# Patient Record
Sex: FEMALE | Race: BLACK OR AFRICAN AMERICAN | NOT HISPANIC OR LATINO | Employment: UNEMPLOYED | URBAN - METROPOLITAN AREA
[De-identification: names, ages, dates, MRNs, and addresses within clinical notes are randomized per-mention and may not be internally consistent; named-entity substitution may affect disease eponyms.]

---

## 2020-09-20 PROBLEM — R03.0 ELEVATED BLOOD PRESSURE READING: Status: ACTIVE | Noted: 2020-09-20

## 2020-09-20 PROBLEM — Z3A.33 33 WEEKS GESTATION OF PREGNANCY: Status: ACTIVE | Noted: 2020-09-20

## 2020-09-20 PROBLEM — R06.02 SHORTNESS OF BREATH: Status: ACTIVE | Noted: 2020-09-20

## 2020-09-20 PROBLEM — E87.6 HYPOKALEMIA: Status: ACTIVE | Noted: 2020-09-20

## 2020-09-20 PROBLEM — R73.09 BLOOD GLUCOSE ABNORMAL: Status: ACTIVE | Noted: 2020-09-20

## 2020-09-20 PROBLEM — R10.9 ABDOMINAL PAIN AFFECTING PREGNANCY: Status: ACTIVE | Noted: 2020-09-20

## 2020-09-20 PROBLEM — O26.899 ABDOMINAL PAIN AFFECTING PREGNANCY: Status: ACTIVE | Noted: 2020-09-20

## 2020-09-29 PROBLEM — R06.02 SHORTNESS OF BREATH: Status: RESOLVED | Noted: 2020-09-20 | Resolved: 2020-09-29

## 2020-09-29 PROBLEM — R06.02 SHORTNESS OF BREATH: Status: ACTIVE | Noted: 2020-09-29

## 2020-09-29 PROBLEM — R03.0 ELEVATED BLOOD PRESSURE READING: Status: RESOLVED | Noted: 2020-09-20 | Resolved: 2020-09-29

## 2020-09-29 PROBLEM — O09.40 GESTATIONAL DIABETES MELLITUS (GDM) AFFECTING FIFTH PREGNANCY: Status: ACTIVE | Noted: 2020-09-29

## 2020-09-29 PROBLEM — Z3A.33 33 WEEKS GESTATION OF PREGNANCY: Status: RESOLVED | Noted: 2020-09-20 | Resolved: 2020-09-29

## 2020-09-29 PROBLEM — O36.8130 DECREASED FETAL MOVEMENTS IN THIRD TRIMESTER: Status: ACTIVE | Noted: 2020-09-29

## 2020-09-29 PROBLEM — O24.419 GESTATIONAL DIABETES MELLITUS (GDM) AFFECTING FIFTH PREGNANCY: Status: ACTIVE | Noted: 2020-09-29

## 2020-09-29 PROBLEM — R00.0 TACHYCARDIA: Status: ACTIVE | Noted: 2020-09-29

## 2020-09-29 PROBLEM — E87.6 HYPOKALEMIA: Status: RESOLVED | Noted: 2020-09-20 | Resolved: 2020-09-29

## 2020-09-29 PROBLEM — O26.899 ABDOMINAL PAIN AFFECTING PREGNANCY: Status: RESOLVED | Noted: 2020-09-20 | Resolved: 2020-09-29

## 2020-09-29 PROBLEM — R10.9 ABDOMINAL PAIN AFFECTING PREGNANCY: Status: RESOLVED | Noted: 2020-09-20 | Resolved: 2020-09-29

## 2020-09-29 PROBLEM — Z3A.34 34 WEEKS GESTATION OF PREGNANCY: Status: ACTIVE | Noted: 2020-09-29

## 2020-09-30 PROBLEM — R11.2 NAUSEA & VOMITING: Status: ACTIVE | Noted: 2020-09-30

## 2020-09-30 PROBLEM — R63.1 POLYDIPSIA: Status: ACTIVE | Noted: 2020-09-30

## 2020-10-11 PROBLEM — O23.43 URINARY TRACT INFECTION IN MOTHER DURING THIRD TRIMESTER OF PREGNANCY: Status: ACTIVE | Noted: 2020-10-11

## 2020-10-11 PROBLEM — B95.1 POSITIVE GBS TEST: Status: ACTIVE | Noted: 2020-10-11

## 2020-10-11 PROBLEM — N89.8 VAGINAL DISCHARGE: Status: ACTIVE | Noted: 2020-10-11

## 2020-10-11 PROBLEM — Z3A.36 36 WEEKS GESTATION OF PREGNANCY: Status: ACTIVE | Noted: 2020-09-29

## 2020-10-12 PROBLEM — Z03.71 ENCOUNTER FOR SUSPECTED PREMATURE RUPTURE OF AMNIOTIC MEMBRANES, WITH RUPTURE OF MEMBRANES NOT FOUND: Status: ACTIVE | Noted: 2020-10-12

## 2020-10-12 PROBLEM — N89.8 VAGINAL DISCHARGE: Status: RESOLVED | Noted: 2020-10-11 | Resolved: 2020-10-12

## 2020-10-19 PROBLEM — Z03.71 ENCOUNTER FOR SUSPECTED PREMATURE RUPTURE OF AMNIOTIC MEMBRANES, WITH RUPTURE OF MEMBRANES NOT FOUND: Status: RESOLVED | Noted: 2020-10-12 | Resolved: 2020-10-19

## 2020-10-19 PROBLEM — R10.9 ABDOMINAL PAIN AFFECTING PREGNANCY: Status: RESOLVED | Noted: 2020-09-20 | Resolved: 2020-10-19

## 2020-10-19 PROBLEM — O26.899 ABDOMINAL PAIN AFFECTING PREGNANCY: Status: RESOLVED | Noted: 2020-09-20 | Resolved: 2020-10-19

## 2020-10-19 PROBLEM — R11.2 NAUSEA & VOMITING: Status: RESOLVED | Noted: 2020-09-30 | Resolved: 2020-10-19

## 2020-10-19 PROBLEM — O09.93 SUPERVISION OF HIGH RISK PREGNANCY IN THIRD TRIMESTER: Status: ACTIVE | Noted: 2020-10-19

## 2020-10-19 PROBLEM — O36.8130 DECREASED FETAL MOVEMENTS IN THIRD TRIMESTER: Status: RESOLVED | Noted: 2020-09-29 | Resolved: 2020-10-19

## 2020-10-20 PROBLEM — O98.513 COVID-19 AFFECTING PREGNANCY IN THIRD TRIMESTER: Status: ACTIVE | Noted: 2020-10-20

## 2020-10-20 PROBLEM — U07.1 COVID-19 AFFECTING PREGNANCY IN THIRD TRIMESTER: Status: ACTIVE | Noted: 2020-10-20

## 2020-10-20 PROBLEM — O98.513 COVID-19 AFFECTING PREGNANCY IN THIRD TRIMESTER: Status: RESOLVED | Noted: 2020-10-20 | Resolved: 2020-10-20

## 2020-10-20 PROBLEM — U07.1 COVID-19 AFFECTING PREGNANCY IN THIRD TRIMESTER: Status: RESOLVED | Noted: 2020-10-20 | Resolved: 2020-10-20

## 2020-10-20 PROBLEM — Z3A.37 37 WEEKS GESTATION OF PREGNANCY: Status: ACTIVE | Noted: 2020-09-29

## 2021-09-27 PROBLEM — Z3A.37 37 WEEKS GESTATION OF PREGNANCY: Status: RESOLVED | Noted: 2020-09-29 | Resolved: 2021-09-27

## 2023-02-21 PROBLEM — R73.03 PRE-DIABETES: Chronic | Status: ACTIVE | Noted: 2023-02-21

## 2023-02-21 PROBLEM — F32.A DEPRESSION: Status: ACTIVE | Noted: 2023-02-21

## 2023-02-21 PROBLEM — F20.9 SCHIZOPHRENIA: Chronic | Status: ACTIVE | Noted: 2023-02-21

## 2023-07-24 ENCOUNTER — PATIENT MESSAGE (OUTPATIENT)
Dept: RESEARCH | Facility: HOSPITAL | Age: 28
End: 2023-07-24

## 2023-09-01 PROBLEM — F90.9 ADHD: Status: ACTIVE | Noted: 2023-09-01

## 2023-09-01 PROBLEM — F31.60 BIPOLAR 1 DISORDER, MIXED: Status: ACTIVE | Noted: 2023-09-01

## 2023-09-01 PROBLEM — F32.A DEPRESSION: Status: RESOLVED | Noted: 2023-02-21 | Resolved: 2023-09-01

## 2023-10-02 PROBLEM — E78.5 HYPERLIPIDEMIA: Status: ACTIVE | Noted: 2023-10-02

## 2023-11-09 PROBLEM — F60.3 BORDERLINE PERSONALITY DISORDER: Status: ACTIVE | Noted: 2023-11-09

## 2023-11-14 ENCOUNTER — PATIENT MESSAGE (OUTPATIENT)
Dept: ADMINISTRATIVE | Facility: HOSPITAL | Age: 28
End: 2023-11-14

## 2023-11-28 ENCOUNTER — SOCIAL WORK (OUTPATIENT)
Dept: ADMINISTRATIVE | Facility: OTHER | Age: 28
End: 2023-11-28

## 2023-11-28 NOTE — PROGRESS NOTES
Sw received a consult to assist with DBT resources. Sw mailed Patient the contact information for some counselors that specialize in DBT. She was encouraged to contact one to schedule an assessment if she was still in need of this service.    [facility]    (online)     [phone]  Carla Osorio [facility]    (online)      [phone]  [facility]   [phone]  Duane Williamsn, [facility]   [phone]  Cresencio Lawton [facility]   [phone]    Lilliana Godoy [facility]    [phone]

## 2024-02-14 PROBLEM — R73.03 PREDIABETES: Status: ACTIVE | Noted: 2024-02-14

## 2024-02-14 PROBLEM — O24.419 GESTATIONAL DIABETES MELLITUS (GDM) AFFECTING FIFTH PREGNANCY: Status: RESOLVED | Noted: 2020-09-29 | Resolved: 2024-02-14

## 2024-02-14 PROBLEM — O09.40 GESTATIONAL DIABETES MELLITUS (GDM) AFFECTING FIFTH PREGNANCY: Status: RESOLVED | Noted: 2020-09-29 | Resolved: 2024-02-14
